# Patient Record
Sex: MALE | Race: WHITE
[De-identification: names, ages, dates, MRNs, and addresses within clinical notes are randomized per-mention and may not be internally consistent; named-entity substitution may affect disease eponyms.]

---

## 2019-09-27 ENCOUNTER — HOSPITAL ENCOUNTER (EMERGENCY)
Dept: HOSPITAL 53 - M ED | Age: 54
Discharge: HOME | End: 2019-09-27
Payer: COMMERCIAL

## 2019-09-27 VITALS — DIASTOLIC BLOOD PRESSURE: 74 MMHG | SYSTOLIC BLOOD PRESSURE: 122 MMHG

## 2019-09-27 VITALS — WEIGHT: 168.34 LBS | BODY MASS INDEX: 22.8 KG/M2 | HEIGHT: 72 IN

## 2019-09-27 DIAGNOSIS — R11.10: ICD-10-CM

## 2019-09-27 DIAGNOSIS — M51.9: ICD-10-CM

## 2019-09-27 DIAGNOSIS — M54.5: Primary | ICD-10-CM

## 2019-09-27 DIAGNOSIS — R56.9: ICD-10-CM

## 2019-09-27 LAB
ALBUMIN SERPL BCG-MCNC: 3.9 GM/DL (ref 3.2–5.2)
ALT SERPL W P-5'-P-CCNC: 36 U/L (ref 12–78)
BASOPHILS # BLD AUTO: 0 10^3/UL (ref 0–0.2)
BASOPHILS NFR BLD AUTO: 0.1 % (ref 0–1)
BILIRUB CONJ SERPL-MCNC: 0.3 MG/DL (ref 0–0.2)
BILIRUB SERPL-MCNC: 1.3 MG/DL (ref 0.2–1)
BUN SERPL-MCNC: 25 MG/DL (ref 7–18)
CALCIUM SERPL-MCNC: 8.7 MG/DL (ref 8.5–10.1)
CHLORIDE SERPL-SCNC: 102 MEQ/L (ref 98–107)
CO2 SERPL-SCNC: 26 MEQ/L (ref 21–32)
CREAT SERPL-MCNC: 0.91 MG/DL (ref 0.7–1.3)
CRP SERPL-MCNC: 0.38 MG/DL (ref 0–0.3)
EOSINOPHIL # BLD AUTO: 0 10^3/UL (ref 0–0.5)
EOSINOPHIL NFR BLD AUTO: 0.1 % (ref 0–3)
ERYTHROCYTE [SEDIMENTATION RATE] IN BLOOD BY WESTERGREN METHOD: 2 MM/HR (ref 0–20)
FLUAV RNA UPPER RESP QL NAA+PROBE: NEGATIVE
FLUBV RNA UPPER RESP QL NAA+PROBE: NEGATIVE
GFR SERPL CREATININE-BSD FRML MDRD: > 60 ML/MIN/{1.73_M2} (ref 56–?)
GLUCOSE SERPL-MCNC: 108 MG/DL (ref 70–100)
HCT VFR BLD AUTO: 40.1 % (ref 42–52)
HGB BLD-MCNC: 14.3 G/DL (ref 13.5–17.5)
LIPASE SERPL-CCNC: 77 U/L (ref 73–393)
LYMPHOCYTES # BLD AUTO: 1.2 10^3/UL (ref 1.5–5)
LYMPHOCYTES NFR BLD AUTO: 11.6 % (ref 24–44)
MCH RBC QN AUTO: 31.8 PG (ref 27–33)
MCHC RBC AUTO-ENTMCNC: 35.7 G/DL (ref 32–36.5)
MCV RBC AUTO: 89.3 FL (ref 80–96)
MONOCYTES # BLD AUTO: 0.7 10^3/UL (ref 0–0.8)
MONOCYTES NFR BLD AUTO: 6.5 % (ref 0–5)
NEUTROPHILS # BLD AUTO: 8.1 10^3/UL (ref 1.5–8.5)
NEUTROPHILS NFR BLD AUTO: 81.3 % (ref 36–66)
PLATELET # BLD AUTO: 203 10^3/UL (ref 150–450)
POTASSIUM SERPL-SCNC: 3.5 MEQ/L (ref 3.5–5.1)
PROT SERPL-MCNC: 6.3 GM/DL (ref 6.4–8.2)
RBC # BLD AUTO: 4.49 10^6/UL (ref 4.3–6.1)
SODIUM SERPL-SCNC: 138 MEQ/L (ref 136–145)
WBC # BLD AUTO: 10 10^3/UL (ref 4–10)

## 2019-09-27 PROCEDURE — 81001 URINALYSIS AUTO W/SCOPE: CPT

## 2019-09-27 PROCEDURE — 86140 C-REACTIVE PROTEIN: CPT

## 2019-09-27 PROCEDURE — 96374 THER/PROPH/DIAG INJ IV PUSH: CPT

## 2019-09-27 PROCEDURE — 73502 X-RAY EXAM HIP UNI 2-3 VIEWS: CPT

## 2019-09-27 PROCEDURE — 87502 INFLUENZA DNA AMP PROBE: CPT

## 2019-09-27 PROCEDURE — 80048 BASIC METABOLIC PNL TOTAL CA: CPT

## 2019-09-27 PROCEDURE — 36415 COLL VENOUS BLD VENIPUNCTURE: CPT

## 2019-09-27 PROCEDURE — 85025 COMPLETE CBC W/AUTO DIFF WBC: CPT

## 2019-09-27 PROCEDURE — 83690 ASSAY OF LIPASE: CPT

## 2019-09-27 PROCEDURE — 96361 HYDRATE IV INFUSION ADD-ON: CPT

## 2019-09-27 PROCEDURE — 72110 X-RAY EXAM L-2 SPINE 4/>VWS: CPT

## 2019-09-27 PROCEDURE — 80076 HEPATIC FUNCTION PANEL: CPT

## 2019-09-27 PROCEDURE — 99284 EMERGENCY DEPT VISIT MOD MDM: CPT

## 2019-09-27 PROCEDURE — 85652 RBC SED RATE AUTOMATED: CPT

## 2019-09-27 PROCEDURE — 72158 MRI LUMBAR SPINE W/O & W/DYE: CPT

## 2019-09-27 NOTE — REP
Clinical:  Nontraumatic hip pain.

 

Technique:  Frontal view of the pelvis with neutral and frog lateral views of the

left hip.

 

Findings:

Osseous structures and joint spaces are intact and normal.  Hip joints appear

symmetric on frontal pelvic radiograph.  No acute fracture dislocation.  No

evidence for healed injury.  No significant degenerative or congenital

abnormalities are appreciated.  Surrounding soft tissues are unremarkable.

 

Impression:

Normal pelvis and left hip series.

 

 

Electronically Signed by

Ariel Marie MD 09/27/2019 06:56 A

## 2019-09-27 NOTE — REP
MRI lumbar spine without and with IV contrast:

 

History:  Rule out diskitis.  No comparison lumbar spine MRI study.  Comparison

radiographs are from September 27, 2019.

 

Technique:  Sagittal and axial T1 and T2-weighted scans are acquired in the usual

fashion with and without fat saturation.  Sequences include spin echo, turbo

spin-echo, and STIR imaging sequences.

 

The gadolinium enhancement dose is 50 ml of intravenous ProHance.

 

MRI findings:  There are six lumbar-type vertebral bodies.  The most cephalic of

these can be seen to have hypoplastic ribs bilaterally and this will be

designated as T12 for the purposes of this report.  There is considerable motion

artifact on several of the sequences, most prominently the postcontrast sequences

and T2 axial sequence.  This does impair image quality. Patient motion between

sequences apparently causes some misalignment of the axial images relative to the

disc spaces.

 

Vertebral body heights are preserved.  Alignment is normal.  There is no evidence

of spondylolysis or spondylolisthesis.  The tip of the conus is normal in

appearance and position at the at T12-L1.  There is degenerative narrowing of the

L3-4 and to a lesser extent L4-5 disc spaces.  There are reactive marrow changes

on either side of the 03/04 disc posteriorly and to the left.  There is no

evidence of disc protrusion.  Diffuse disc bulging is seen however indenting the

ventral margin of the thecal sac.  No central canal stenosis or foraminal

narrowing is appreciated although there is some foraminal segment disc bulging

bilaterally at L3-4. There is some contrast enhancement along the posterior

margin of the bulging L3-4 disc.

 

At L4-5, there is mild diffuse disc bulging as well.  No central canal stenosis

or foraminal narrowing is seen.  There is mild facet hypertrophy bilaterally.

 

The L5-S1 level shows no evidence of disc protrusion, central canal stenosis or

foraminal narrowing.

 

At L2-L3, there is disc narrowing.  There are reactive marrow changes on either

side of the L2-3 disc and there is evidence of a Schmorl's node formation on both

sides of the disc with surrounding edema and some contrast enhancement.  The end

plates are not visibly eroded.  There is some T2 hyperintense signal in the disc.

No paravertebral inflammation is appreciated.  Findings are felt to be most

compatible with atypical degenerative disc marrow signal intensity changes,

possibly acute Schmorl's node formation.  Early diskitis is a less likely

possibility.

 

At L1-2, disc space height is normal.  Signal intensity is preserved.

 

There is some degenerative disc narrowing at T12-L1.  No other disc protrusion is

seen.

 

Impression:

 

 

1.  There is considerable motion related image degradation with this study.

2.  There are atypical signal intensity changes in the L2-3 disc and in the

adjacent endplates including contrast enhancement, however there is no endplate

erosion or destruction and no paravertebral inflammation changes are seen.

Findings are felt to be most compatible with atypical degenerative disc changes.

Early diskitis cannot be completely excluded.  Consider follow-up if symptoms and

concern for diskitis persists.

3.  There is diffuse disc bulging at L3-4 and to a lesser extent, L4-5.  Facet

hypertrophy is noted at several levels.

4.  Please note that there are six lumbar type vertebrae the most cephalic of

which has been designated as T12.

 

 

Electronically Signed by

Edmar Pritchard MD 09/27/2019 08:39 A

## 2019-09-27 NOTE — REP
Clinical:  Pain.

 

Technique:  AP, lateral, bilateral oblique and coned-down views of the

lumbosacral spine.

 

Findings:

Alignment and lordosis maintained.  No acute fracture / compression injury or

subluxation.  Moderate multilevel degenerative changes include endplate sclerosis

with minimal marginal spurring, mild disc space narrowing and mild/moderate

hypertrophic facet changes.  No obvious spondylolysis.

 

Impression:

Mild/moderate multilevel degenerative spondylosis.

 

 

Electronically Signed by

Ariel Marie MD 09/27/2019 07:01 A

## 2019-09-27 NOTE — ED PDOC
Post-Departure Follow-Up


dr romero faxed formal report of mri ls spine for fu mlg Lundborg-Gray,Maja MD          Sep 27, 2019 19:35

## 2022-07-05 ENCOUNTER — HOSPITAL ENCOUNTER (OUTPATIENT)
Dept: HOSPITAL 53 - M RAD | Age: 57
End: 2022-07-05
Attending: FAMILY MEDICINE
Payer: COMMERCIAL

## 2022-07-05 DIAGNOSIS — Z12.2: Primary | ICD-10-CM

## 2023-05-17 ENCOUNTER — HOSPITAL ENCOUNTER (OUTPATIENT)
Dept: HOSPITAL 53 - M OPP | Age: 58
Discharge: HOME | End: 2023-05-17
Attending: INTERNAL MEDICINE
Payer: COMMERCIAL

## 2023-05-17 VITALS — WEIGHT: 165 LBS | BODY MASS INDEX: 22.35 KG/M2 | HEIGHT: 72 IN

## 2023-05-17 VITALS — DIASTOLIC BLOOD PRESSURE: 70 MMHG | SYSTOLIC BLOOD PRESSURE: 149 MMHG

## 2023-05-17 DIAGNOSIS — K64.0: ICD-10-CM

## 2023-05-17 DIAGNOSIS — D12.6: ICD-10-CM

## 2023-05-17 DIAGNOSIS — K57.30: ICD-10-CM

## 2023-05-17 DIAGNOSIS — Z12.11: Primary | ICD-10-CM

## 2024-12-22 ENCOUNTER — HOSPITAL ENCOUNTER (OUTPATIENT)
Dept: HOSPITAL 53 - M EKG | Age: 59
End: 2024-12-22
Attending: FAMILY MEDICINE
Payer: COMMERCIAL

## 2024-12-22 DIAGNOSIS — R00.1: ICD-10-CM

## 2024-12-22 DIAGNOSIS — Z01.818: Primary | ICD-10-CM

## 2024-12-22 LAB
ALBUMIN SERPL BCG-MCNC: 3.9 G/DL (ref 3.2–5.2)
ALP SERPL-CCNC: 52 U/L (ref 40–129)
ALT SERPL W P-5'-P-CCNC: 20 U/L (ref 7–40)
AST SERPL-CCNC: 16 U/L (ref ?–34)
BASOPHILS # BLD AUTO: 0.1 10^3/UL (ref 0–0.2)
BASOPHILS NFR BLD AUTO: 0.6 % (ref 0–1)
BILIRUB SERPL-MCNC: 0.7 MG/DL (ref 0.3–1.2)
BUN SERPL-MCNC: 26 MG/DL (ref 9–23)
CALCIUM SERPL-MCNC: 9.7 MG/DL (ref 8.3–10.6)
CHLORIDE SERPL-SCNC: 105 MMOL/L (ref 98–107)
CO2 SERPL-SCNC: 25 MMOL/L (ref 20–31)
CREAT SERPL-MCNC: 0.82 MG/DL (ref 0.7–1.3)
EOSINOPHIL # BLD AUTO: 0.1 10^3/UL (ref 0–0.5)
EOSINOPHIL NFR BLD AUTO: 1.8 % (ref 0–3)
GFR SERPL CREATININE-BSD FRML MDRD: > 60 ML/MIN/{1.73_M2} (ref 49–?)
GLUCOSE SERPL-MCNC: 117 MG/DL (ref 74–106)
HCT VFR BLD AUTO: 45.3 % (ref 42–52)
HGB BLD-MCNC: 15.4 G/DL (ref 13.5–17.5)
LYMPHOCYTES # BLD AUTO: 1.6 10^3/UL (ref 1.5–5)
LYMPHOCYTES NFR BLD AUTO: 19.9 % (ref 24–44)
MCH RBC QN AUTO: 30.6 PG (ref 27–33)
MCHC RBC AUTO-ENTMCNC: 34 G/DL (ref 32–36.5)
MCV RBC AUTO: 89.9 FL (ref 80–96)
MONOCYTES # BLD AUTO: 0.5 10^3/UL (ref 0–0.8)
MONOCYTES NFR BLD AUTO: 5.9 % (ref 2–8)
NEUTROPHILS # BLD AUTO: 5.7 10^3/UL (ref 1.5–8.5)
NEUTROPHILS NFR BLD AUTO: 71.5 % (ref 36–66)
PLATELET # BLD AUTO: 203 10^3/UL (ref 150–450)
POTASSIUM SERPL-SCNC: 4.3 MMOL/L (ref 3.5–5.1)
PROT SERPL-MCNC: 7.2 G/DL (ref 5.7–8.2)
RBC # BLD AUTO: 5.04 10^6/UL (ref 4.3–6.1)
SODIUM SERPL-SCNC: 139 MMOL/L (ref 136–145)
WBC # BLD AUTO: 8 10^3/UL (ref 4–10)